# Patient Record
Sex: FEMALE | Race: WHITE | NOT HISPANIC OR LATINO | URBAN - NONMETROPOLITAN AREA
[De-identification: names, ages, dates, MRNs, and addresses within clinical notes are randomized per-mention and may not be internally consistent; named-entity substitution may affect disease eponyms.]

---

## 2021-12-15 PROBLEM — I10 HYPERTENSION: Chronic | Status: CHRONIC | Noted: 2021-12-15

## 2021-12-15 PROBLEM — E78.5 HYPERLIPIDEMIA: Chronic | Status: CHRONIC | Noted: 2021-12-15

## 2021-12-15 PROBLEM — Z79.899 HIGH RISK MEDICATION USE: Chronic | Status: CHRONIC | Noted: 2021-12-15

## 2022-09-21 PROBLEM — D64.9 POSTOPERATIVE ANEMIA: Chronic | Status: CHRONIC | Noted: 2022-09-21

## 2022-11-04 PROBLEM — N30.00 ACUTE CYSTITIS: Status: RESOLVED | Noted: 2022-09-07

## 2023-05-01 PROBLEM — E11.9 DIABETES MELLITUS (CMS/HCC): Chronic | Status: CHRONIC | Noted: 2021-12-15

## 2023-05-01 PROBLEM — M19.90 ARTHRITIS: Chronic | Status: CHRONIC | Noted: 2023-05-01

## 2023-05-01 PROBLEM — K57.90 DIVERTICULAR DISEASE: Chronic | Status: CHRONIC | Noted: 2021-12-15

## 2023-05-01 PROBLEM — E66.9 OBESITY WITH BODY MASS INDEX 30 OR GREATER: Chronic | Status: CHRONIC | Noted: 2022-11-04

## 2023-05-01 PROBLEM — K76.0 STEATOSIS OF LIVER: Chronic | Status: CHRONIC | Noted: 2021-12-15

## 2023-05-01 PROBLEM — F41.9 ANXIETY: Chronic | Status: CHRONIC | Noted: 2023-05-01

## 2023-05-01 PROBLEM — K44.9 HIATAL HERNIA: Chronic | Status: CHRONIC | Noted: 2021-12-15

## 2023-05-01 PROBLEM — K63.5 POLYP OF COLON: Chronic | Status: CHRONIC | Noted: 2023-05-01

## 2023-05-01 PROBLEM — Z86.010 HISTORY OF COLONIC POLYPS: Chronic | Status: CHRONIC | Noted: 2023-05-01

## 2023-05-01 PROBLEM — Z98.811 DENTAL CROWN PRESENT: Chronic | Status: CHRONIC | Noted: 2023-05-01

## 2023-05-01 PROBLEM — Z97.3 WEARS EYEGLASSES: Chronic | Status: CHRONIC | Noted: 2023-05-01

## 2023-09-11 ENCOUNTER — OFFICE (OUTPATIENT)
Dept: URBAN - NONMETROPOLITAN AREA CLINIC 1 | Facility: CLINIC | Age: 68
End: 2023-09-11

## 2023-09-11 VITALS
WEIGHT: 220 LBS | HEART RATE: 73 BPM | DIASTOLIC BLOOD PRESSURE: 83 MMHG | SYSTOLIC BLOOD PRESSURE: 132 MMHG | HEIGHT: 65 IN

## 2023-09-11 DIAGNOSIS — Z86.010 PERSONAL HISTORY OF COLONIC POLYPS: ICD-10-CM

## 2023-09-11 DIAGNOSIS — Z80.0 FAMILY HISTORY OF MALIGNANT NEOPLASM OF DIGESTIVE ORGANS: ICD-10-CM

## 2023-09-11 DIAGNOSIS — Z90.49 ACQUIRED ABSENCE OF OTHER SPECIFIED PARTS OF DIGESTIVE TRACT: ICD-10-CM

## 2023-09-11 PROCEDURE — 99213 OFFICE O/P EST LOW 20 MIN: CPT | Performed by: NURSE PRACTITIONER

## 2023-09-11 RX ORDER — POLYETHYLENE GLYCOL 3350, SODIUM SULFATE, POTASSIUM CHLORIDE, MAGNESIUM SULFATE, AND SODIUM CHLORIDE FOR ORAL SOLUTION 178.7-7.3G
KIT ORAL
Qty: 1 | Refills: 0 | Status: ACTIVE
Start: 2023-09-11

## 2023-09-11 NOTE — SERVICEHPINOTES
pleasant 68-year-old female presents for a 1 year follow-up/surveillance colonoscopy.  She has a history of a large tubulovillous adenoma that was noted on colonoscopy in 2021, with recurrence on colonoscopy in 2022. Dr. Guevara recommended right colon resection which she had completed in August 2022. Pathology revealed tubular adenoma, no high-grade dysplasia.  She is doing very well.  Has chronic GERD that is well controlled on once daily omeprazole.  Patient denies any abdominal pain, diarrhea, constipation, rectal bleeding or melena. Denies dysphagia, nausea or vomiting. No changes in appetite or unintentional weight loss.

## 2023-09-11 NOTE — SERVICENOTES
Record review reveals history of severe fatty liver on fibroscan in 2020. Recommend clinic follow up in about 6 months to repeat fibroscan.  Labs in May 2023 revealed normal liver  enzymes

## 2024-04-04 ENCOUNTER — ON CAMPUS - OUTPATIENT (OUTPATIENT)
Dept: URBAN - NONMETROPOLITAN AREA HOSPITAL 34 | Facility: HOSPITAL | Age: 69
End: 2024-04-04
Payer: MEDICARE

## 2024-04-04 DIAGNOSIS — Z09 ENCOUNTER FOR FOLLOW-UP EXAMINATION AFTER COMPLETED TREATMEN: ICD-10-CM

## 2024-04-04 DIAGNOSIS — Z86.010 PERSONAL HISTORY OF COLONIC POLYPS: ICD-10-CM

## 2024-04-04 PROCEDURE — G0105 COLORECTAL SCRN; HI RISK IND: HCPCS | Performed by: INTERNAL MEDICINE
